# Patient Record
Sex: MALE | Race: WHITE | NOT HISPANIC OR LATINO | Employment: FULL TIME | URBAN - METROPOLITAN AREA
[De-identification: names, ages, dates, MRNs, and addresses within clinical notes are randomized per-mention and may not be internally consistent; named-entity substitution may affect disease eponyms.]

---

## 2023-08-31 DIAGNOSIS — I10 HYPERTENSION, UNSPECIFIED TYPE: ICD-10-CM

## 2023-08-31 DIAGNOSIS — B02.8 HERPES ZOSTER INFECTION OF ORAL MUCOSA: ICD-10-CM

## 2023-08-31 RX ORDER — VALACYCLOVIR HYDROCHLORIDE 1 G/1
TABLET, FILM COATED ORAL DAILY
COMMUNITY
Start: 2016-06-24 | End: 2023-08-31 | Stop reason: SDUPTHER

## 2023-08-31 RX ORDER — VALACYCLOVIR HYDROCHLORIDE 1 G/1
2000 TABLET, FILM COATED ORAL 2 TIMES DAILY
Qty: 12 TABLET | Refills: 0 | Status: SHIPPED | OUTPATIENT
Start: 2023-08-31 | End: 2023-09-01

## 2023-08-31 RX ORDER — PROPRANOLOL HYDROCHLORIDE 20 MG/1
20 TABLET ORAL DAILY
COMMUNITY
End: 2023-08-31 | Stop reason: SDUPTHER

## 2023-08-31 RX ORDER — PROPRANOLOL HYDROCHLORIDE 20 MG/1
20 TABLET ORAL DAILY
Qty: 30 TABLET | Refills: 1 | Status: SHIPPED | OUTPATIENT
Start: 2023-08-31 | End: 2024-01-17 | Stop reason: SDUPTHER

## 2023-09-06 DIAGNOSIS — Z00.00 HEALTHCARE MAINTENANCE: ICD-10-CM

## 2023-09-12 PROBLEM — M67.432 GANGLION CYST OF WRIST, LEFT: Status: ACTIVE | Noted: 2023-09-12

## 2023-09-12 PROBLEM — R14.0 ABDOMINAL BLOATING: Status: ACTIVE | Noted: 2023-09-12

## 2023-09-12 PROBLEM — K62.5 RECTAL BLEEDING: Status: ACTIVE | Noted: 2023-09-12

## 2023-09-12 PROBLEM — R10.9 ABDOMINAL PAIN: Status: ACTIVE | Noted: 2023-09-12

## 2023-09-12 PROBLEM — R63.5 WEIGHT INCREASE: Status: ACTIVE | Noted: 2023-09-12

## 2023-09-12 PROBLEM — N46.9 MALE INFERTILITY: Status: ACTIVE | Noted: 2023-09-12

## 2023-09-12 PROBLEM — R22.32 ARM MASS, LEFT: Status: ACTIVE | Noted: 2023-09-12

## 2023-09-12 PROBLEM — E78.00 ELEVATED LDL CHOLESTEROL LEVEL: Status: ACTIVE | Noted: 2023-09-12

## 2023-09-12 PROBLEM — L72.9 SUBCUTANEOUS CYST: Status: ACTIVE | Noted: 2023-09-12

## 2023-09-12 PROBLEM — F43.0 STRESS REACTION: Status: ACTIVE | Noted: 2023-09-12

## 2023-09-12 PROBLEM — K13.79 LUMP IN MOUTH: Status: ACTIVE | Noted: 2023-09-12

## 2023-09-12 PROBLEM — R10.31 COLICKY RIGHT LOWER QUADRANT PAIN: Status: ACTIVE | Noted: 2023-09-12

## 2023-09-12 PROBLEM — I10 BENIGN ESSENTIAL HYPERTENSION: Status: ACTIVE | Noted: 2023-09-12

## 2023-09-12 PROBLEM — K11.6: Status: ACTIVE | Noted: 2023-09-12

## 2023-09-12 PROBLEM — N52.9 MALE ERECTILE DISORDER: Status: ACTIVE | Noted: 2023-09-12

## 2023-09-12 PROBLEM — K63.5 COLON POLYPS: Status: ACTIVE | Noted: 2023-09-12

## 2023-09-12 PROBLEM — B00.1 RECURRENT COLD SORES: Status: ACTIVE | Noted: 2023-09-12

## 2023-09-12 RX ORDER — SILDENAFIL CITRATE 20 MG/1
20 TABLET ORAL 3 TIMES DAILY
COMMUNITY
End: 2023-09-22 | Stop reason: DRUGHIGH

## 2023-09-12 RX ORDER — VALACYCLOVIR HYDROCHLORIDE 1 G/1
1000 TABLET, FILM COATED ORAL 2 TIMES DAILY
COMMUNITY
End: 2024-03-13 | Stop reason: SDUPTHER

## 2023-09-22 ENCOUNTER — TELEPHONE (OUTPATIENT)
Dept: PRIMARY CARE | Facility: CLINIC | Age: 33
End: 2023-09-22

## 2023-09-22 RX ORDER — SILDENAFIL CITRATE 20 MG/1
20-60 TABLET ORAL DAILY PRN
Qty: 60 TABLET | Refills: 0 | Status: SHIPPED | OUTPATIENT
Start: 2023-09-22 | End: 2023-10-25 | Stop reason: SDUPTHER

## 2023-09-26 ENCOUNTER — APPOINTMENT (OUTPATIENT)
Dept: PRIMARY CARE | Facility: CLINIC | Age: 33
End: 2023-09-26

## 2023-10-20 ENCOUNTER — LAB (OUTPATIENT)
Dept: LAB | Facility: LAB | Age: 33
End: 2023-10-20
Payer: COMMERCIAL

## 2023-10-20 DIAGNOSIS — Z00.00 HEALTHCARE MAINTENANCE: ICD-10-CM

## 2023-10-20 LAB
25(OH)D3 SERPL-MCNC: 23 NG/ML (ref 30–100)
ALBUMIN SERPL BCP-MCNC: 4.5 G/DL (ref 3.4–5)
ALP SERPL-CCNC: 69 U/L (ref 33–120)
ALT SERPL W P-5'-P-CCNC: 18 U/L (ref 10–52)
ANION GAP SERPL CALC-SCNC: 12 MMOL/L (ref 10–20)
APPEARANCE UR: CLEAR
AST SERPL W P-5'-P-CCNC: 16 U/L (ref 9–39)
BASOPHILS # BLD AUTO: 0.05 X10*3/UL (ref 0–0.1)
BASOPHILS NFR BLD AUTO: 0.9 %
BILIRUB SERPL-MCNC: 0.8 MG/DL (ref 0–1.2)
BILIRUB UR STRIP.AUTO-MCNC: NEGATIVE MG/DL
BUN SERPL-MCNC: 15 MG/DL (ref 6–23)
CALCIUM SERPL-MCNC: 9.3 MG/DL (ref 8.6–10.3)
CHLORIDE SERPL-SCNC: 101 MMOL/L (ref 98–107)
CHOLEST SERPL-MCNC: 263 MG/DL (ref 0–199)
CHOLESTEROL/HDL RATIO: 8.6
CO2 SERPL-SCNC: 29 MMOL/L (ref 21–32)
COLOR UR: YELLOW
CREAT SERPL-MCNC: 1.05 MG/DL (ref 0.5–1.3)
CRP SERPL HS-MCNC: 2.9 MG/L
EOSINOPHIL # BLD AUTO: 0.24 X10*3/UL (ref 0–0.7)
EOSINOPHIL NFR BLD AUTO: 4.4 %
ERYTHROCYTE [DISTWIDTH] IN BLOOD BY AUTOMATED COUNT: 11.4 % (ref 11.5–14.5)
EST. AVERAGE GLUCOSE BLD GHB EST-MCNC: 105 MG/DL
GFR SERPL CREATININE-BSD FRML MDRD: >90 ML/MIN/1.73M*2
GLUCOSE SERPL-MCNC: 87 MG/DL (ref 74–99)
GLUCOSE UR STRIP.AUTO-MCNC: NEGATIVE MG/DL
HBA1C MFR BLD: 5.3 %
HCT VFR BLD AUTO: 43.8 % (ref 41–52)
HDLC SERPL-MCNC: 30.6 MG/DL
HGB BLD-MCNC: 14.9 G/DL (ref 13.5–17.5)
IMM GRANULOCYTES # BLD AUTO: 0.02 X10*3/UL (ref 0–0.7)
IMM GRANULOCYTES NFR BLD AUTO: 0.4 % (ref 0–0.9)
KETONES UR STRIP.AUTO-MCNC: NEGATIVE MG/DL
LDLC SERPL CALC-MCNC: ABNORMAL MG/DL
LEUKOCYTE ESTERASE UR QL STRIP.AUTO: NEGATIVE
LYMPHOCYTES # BLD AUTO: 1.61 X10*3/UL (ref 1.2–4.8)
LYMPHOCYTES NFR BLD AUTO: 29.3 %
MCH RBC QN AUTO: 30 PG (ref 26–34)
MCHC RBC AUTO-ENTMCNC: 34 G/DL (ref 32–36)
MCV RBC AUTO: 88 FL (ref 80–100)
MONOCYTES # BLD AUTO: 0.5 X10*3/UL (ref 0.1–1)
MONOCYTES NFR BLD AUTO: 9.1 %
NEUTROPHILS # BLD AUTO: 3.08 X10*3/UL (ref 1.2–7.7)
NEUTROPHILS NFR BLD AUTO: 55.9 %
NITRITE UR QL STRIP.AUTO: NEGATIVE
NON HDL CHOLESTEROL: 232 MG/DL (ref 0–149)
NRBC BLD-RTO: 0 /100 WBCS (ref 0–0)
PH UR STRIP.AUTO: 6 [PH]
PLATELET # BLD AUTO: 271 X10*3/UL (ref 150–450)
PMV BLD AUTO: 10 FL (ref 7.5–11.5)
POTASSIUM SERPL-SCNC: 4 MMOL/L (ref 3.5–5.3)
PROT SERPL-MCNC: 6.9 G/DL (ref 6.4–8.2)
PROT UR STRIP.AUTO-MCNC: NEGATIVE MG/DL
RBC # BLD AUTO: 4.96 X10*6/UL (ref 4.5–5.9)
RBC # UR STRIP.AUTO: NEGATIVE /UL
SODIUM SERPL-SCNC: 138 MMOL/L (ref 136–145)
SP GR UR STRIP.AUTO: 1.03
TRIGL SERPL-MCNC: 599 MG/DL (ref 0–149)
TSH SERPL-ACNC: 1.43 MIU/L (ref 0.44–3.98)
UROBILINOGEN UR STRIP.AUTO-MCNC: <2 MG/DL
VLDL: ABNORMAL
WBC # BLD AUTO: 5.5 X10*3/UL (ref 4.4–11.3)

## 2023-10-20 PROCEDURE — 80061 LIPID PANEL: CPT

## 2023-10-20 PROCEDURE — 81003 URINALYSIS AUTO W/O SCOPE: CPT

## 2023-10-20 PROCEDURE — 85025 COMPLETE CBC W/AUTO DIFF WBC: CPT

## 2023-10-20 PROCEDURE — 80053 COMPREHEN METABOLIC PANEL: CPT

## 2023-10-20 PROCEDURE — 84443 ASSAY THYROID STIM HORMONE: CPT

## 2023-10-20 PROCEDURE — 82306 VITAMIN D 25 HYDROXY: CPT

## 2023-10-20 PROCEDURE — 36415 COLL VENOUS BLD VENIPUNCTURE: CPT

## 2023-10-20 PROCEDURE — 86141 C-REACTIVE PROTEIN HS: CPT

## 2023-10-20 PROCEDURE — 83036 HEMOGLOBIN GLYCOSYLATED A1C: CPT

## 2023-10-23 ENCOUNTER — OFFICE VISIT (OUTPATIENT)
Dept: PRIMARY CARE | Facility: CLINIC | Age: 33
End: 2023-10-23

## 2023-10-23 VITALS
HEART RATE: 55 BPM | OXYGEN SATURATION: 95 % | HEIGHT: 74 IN | DIASTOLIC BLOOD PRESSURE: 80 MMHG | SYSTOLIC BLOOD PRESSURE: 132 MMHG | BODY MASS INDEX: 31.06 KG/M2 | WEIGHT: 242 LBS

## 2023-10-23 DIAGNOSIS — R52 PAIN IN SURGICAL SCAR: ICD-10-CM

## 2023-10-23 DIAGNOSIS — F41.9 ANXIETY: ICD-10-CM

## 2023-10-23 DIAGNOSIS — Z00.01 ENCOUNTER FOR GENERAL ADULT MEDICAL EXAMINATION WITH ABNORMAL FINDINGS: Primary | ICD-10-CM

## 2023-10-23 DIAGNOSIS — B00.1 COLD SORE: ICD-10-CM

## 2023-10-23 DIAGNOSIS — K21.9 GASTROESOPHAGEAL REFLUX DISEASE, UNSPECIFIED WHETHER ESOPHAGITIS PRESENT: ICD-10-CM

## 2023-10-23 DIAGNOSIS — R07.9 CHEST PAIN, UNSPECIFIED TYPE: ICD-10-CM

## 2023-10-23 DIAGNOSIS — L90.5 PAIN IN SURGICAL SCAR: ICD-10-CM

## 2023-10-23 DIAGNOSIS — E78.5 DYSLIPIDEMIA: ICD-10-CM

## 2023-10-23 DIAGNOSIS — K63.5 POLYP OF COLON, UNSPECIFIED PART OF COLON, UNSPECIFIED TYPE: ICD-10-CM

## 2023-10-23 PROCEDURE — 3075F SYST BP GE 130 - 139MM HG: CPT | Performed by: FAMILY MEDICINE

## 2023-10-23 PROCEDURE — 1036F TOBACCO NON-USER: CPT | Performed by: FAMILY MEDICINE

## 2023-10-23 PROCEDURE — 3079F DIAST BP 80-89 MM HG: CPT | Performed by: FAMILY MEDICINE

## 2023-10-23 PROCEDURE — 93000 ELECTROCARDIOGRAM COMPLETE: CPT | Performed by: FAMILY MEDICINE

## 2023-10-23 PROCEDURE — UHSPHYS PR UH SELECT PHYSICAL: Performed by: FAMILY MEDICINE

## 2023-10-23 RX ORDER — FAMOTIDINE 40 MG/1
40 TABLET, FILM COATED ORAL NIGHTLY
Qty: 30 TABLET | Refills: 1 | Status: SHIPPED | OUTPATIENT
Start: 2023-10-23

## 2023-10-23 ASSESSMENT — PAIN SCALES - GENERAL: PAINLEVEL: 0-NO PAIN

## 2023-10-23 NOTE — PROGRESS NOTES
"Subjective   Patient ID: Ronnie Ya is a 33 y.o. male who presents for Annual Exam ([facility] ).  HPI  1) For the past few months or so is experiencing chest pains, can be at any time  Can last hours to or up to a week  No prior history of this problem  Seemed to start after he went running 1 time and has really waxed and waned since then  He has not had any recent activity intolerance  Denies any significant shortness of breath, dizziness, lightheadedness, hand or foot swelling that is new or different  Question if there might be a heartburn or reflux issue  He does notice a lot of throat discomfort  Does not do a lot of late-night eating, but he does drink a significant mount of alcohol and often overeats as well  Question this may be a reflux issue although he has not tried any acid neutralizer's or acid reducers  There is no immediate family history of early cardiac disease but both a paternal grandfather and maternal grandfather apparently had coronary issues  He has had a history of some stress/anxiety (they have an infant at home who developed RSV and had to go back in the hospital, 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)  Has been to a psychologist and psychiatrist in the past  Questionable benefit for the former, really no benefit as to the latter  He has been on Cymbalta, Wellbutrin, Adderall without any clear benefit and seems to be managing things okay with propranolol on a short-term basis  He has not been exercising like he used to (in fact he is really not doing anything at all except for going on a run maybe once a week) - also reports that his nutrition \"could be better\", states that he stress eats and overeats and tends to have sugary drinks and trouble controlling his portions  States he did really well nutrition wise when he had been doing and recording everything  Has not really ever tried meditation but states he is versed in breath work and finds that to be helpful tool  2) history " "of colon polyps and advised he needed to have that done every 3 to 5 years  Appears last time was 5 years ago occasional bright red blood per rectum and was told he had internal hemorrhoids  No painful bleeding and no change in stools such as dark tarry stool or gross blood in stool  No immediate family history of colon disease   3) history of cold sores, well-managed with valacyclovir 1 day treatment   4) prior history of angiolipomas/cysts removed from upper extremities  The scars are very painful and sore to the touch  He would like to about them getting revised would recommend plastic surgeon for this    Review of Systems  Essentially noncontributory otherwise    Objective   /80 (BP Location: Right arm, Patient Position: Sitting, BP Cuff Size: Large adult)   Pulse 55   Ht 1.88 m (6' 2\")   Wt 110 kg (242 lb)   SpO2 95%   BMI 31.07 kg/m²     Physical Exam  Vitals and nursing note reviewed.   Constitutional:       General: He is not in acute distress.     Appearance: He is not ill-appearing.   HENT:      Head: Normocephalic and atraumatic.      Right Ear: Tympanic membrane normal.      Left Ear: Tympanic membrane normal.      Mouth/Throat:      Pharynx: No posterior oropharyngeal erythema.   Eyes:      General: No scleral icterus.     Extraocular Movements: Extraocular movements intact.      Pupils: Pupils are equal, round, and reactive to light.   Cardiovascular:      Rate and Rhythm: Normal rate and regular rhythm.      Heart sounds: No murmur heard.  Pulmonary:      Breath sounds: Normal breath sounds. No wheezing or rhonchi.   Abdominal:      General: Bowel sounds are normal. There is no distension.      Palpations: Abdomen is soft.      Tenderness: There is no abdominal tenderness.   Musculoskeletal:         General: Normal range of motion.      Cervical back: Normal range of motion.      Right lower leg: No edema.      Left lower leg: No edema.   Lymphadenopathy:      Cervical: No cervical " adenopathy.   Skin:     General: Skin is warm and dry.   Neurological:      Mental Status: He is alert and oriented to person, place, and time. Mental status is at baseline.      Motor: No weakness.      Coordination: Coordination normal.      Deep Tendon Reflexes: Reflexes normal.   Psychiatric:         Mood and Affect: Mood normal.         Behavior: Behavior normal.         Thought Content: Thought content normal.         Judgment: Judgment normal.         Assessment/Plan   Problem List Items Addressed This Visit       Colon polyps    Relevant Orders    Colonoscopy Screening; Average Risk Patient     Other Visit Diagnoses       Encounter for general adult medical examination with abnormal findings    -  Primary    Dyslipidemia        Chest pain, unspecified type        Relevant Orders    ECG 12 Lead    EGD    Gastroesophageal reflux disease, unspecified whether esophagitis present        Relevant Medications    famotidine (Pepcid) 40 mg tablet    Other Relevant Orders    EGD    Anxiety        Pain in surgical scar        Relevant Orders    Referral to Plastic Surgery    Cold sore            Orders as above  1) for reflux and chest pain issues feel this is perhaps a combination of stress, alcohol consumption, reflux  We will do trial of famotidine 40 mg at bedtime, if really no improvement over next week would consider taking twice daily  Patient update us  2) dyslipidemia, very high triglycerides and low HDL  Strongly encouraged with dietary modification will help improve his triglycerides but really the only way to go forward and improve his HDL is through more regular activity  Intensive lifestyle changes he is hesitant to set an exercise goal but plans over the next 3 to 4 weeks to engage in better portion control decrease his indulgences to really 2 times per week and reduce alcohol to only on the weekends lipid order placed to follow-up  3) he also mentioned that he has had 2 days in November the past years  of significant sweats  Feels he may have picked up something when he was traveling abroad and had not taken prophylactic malaria pills given to him -was not diagnosed with malaria  Recent CBC did not reveal any marked abnormality but discussed rechecking his CBC when he has had these sweating episodes again  Consider a malaria smear in the throes of these episodes

## 2023-10-23 NOTE — Clinical Note
Patient needs C-scope and EGD - MAY need assistance in scheduling - thanks! 
Ricky Neumann on behalf of Dr. Moreira

## 2023-10-25 DIAGNOSIS — N52.9 MALE ERECTILE DISORDER: ICD-10-CM

## 2023-10-25 RX ORDER — SILDENAFIL CITRATE 20 MG/1
20-60 TABLET ORAL DAILY PRN
Qty: 60 TABLET | Refills: 3 | Status: SHIPPED | OUTPATIENT
Start: 2023-10-25 | End: 2024-05-30 | Stop reason: SDUPTHER

## 2023-10-26 DIAGNOSIS — Z12.11 SPECIAL SCREENING FOR MALIGNANT NEOPLASMS, COLON: ICD-10-CM

## 2023-10-26 RX ORDER — POLYETHYLENE GLYCOL 3350, SODIUM SULFATE ANHYDROUS, SODIUM BICARBONATE, SODIUM CHLORIDE, POTASSIUM CHLORIDE 236; 22.74; 6.74; 5.86; 2.97 G/4L; G/4L; G/4L; G/4L; G/4L
POWDER, FOR SOLUTION ORAL
Qty: 4000 ML | Refills: 0 | Status: SHIPPED | OUTPATIENT
Start: 2023-10-26 | End: 2024-05-30 | Stop reason: HOSPADM

## 2023-11-03 ENCOUNTER — TELEPHONE (OUTPATIENT)
Dept: PRIMARY CARE | Facility: CLINIC | Age: 33
End: 2023-11-03

## 2023-11-03 DIAGNOSIS — F41.9 ANXIETY: Primary | ICD-10-CM

## 2023-11-03 RX ORDER — FLUOXETINE 10 MG/1
10 TABLET ORAL DAILY
Qty: 30 TABLET | Refills: 3 | Status: SHIPPED | OUTPATIENT
Start: 2023-11-03 | End: 2024-03-13 | Stop reason: SDUPTHER

## 2023-11-04 NOTE — TELEPHONE ENCOUNTER
Called and discussed with patient  States the propranolol really is not doing him much.  Does not seem to last very long and provide significant relief he did note that he had been on bupropion in the past and duloxetine, cannot remember which 1 of these was problematic when he was taking Adderall  Not aware of his immediate family taking anything for anxiety such as this  Discussed options and ultimately seems to prefer to try fluoxetine at low-dose as ordered    Requested Prescriptions     Signed Prescriptions Disp Refills    FLUoxetine (PROzac) 10 mg tablet 30 tablet 3     Sig: Take 1 tablet (10 mg) by mouth once daily.     Authorizing Provider: PREET LY to update me within a week or so

## 2023-11-21 ENCOUNTER — TELEPHONE (OUTPATIENT)
Dept: PRIMARY CARE | Facility: CLINIC | Age: 33
End: 2023-11-21

## 2023-11-21 DIAGNOSIS — E78.1 HIGH TRIGLYCERIDES: ICD-10-CM

## 2023-11-21 DIAGNOSIS — R63.5 WEIGHT INCREASE: Primary | ICD-10-CM

## 2023-11-22 ENCOUNTER — LAB (OUTPATIENT)
Dept: LAB | Facility: LAB | Age: 33
End: 2023-11-22
Payer: COMMERCIAL

## 2023-11-22 DIAGNOSIS — R63.5 WEIGHT INCREASE: ICD-10-CM

## 2023-11-22 DIAGNOSIS — E78.1 HIGH TRIGLYCERIDES: ICD-10-CM

## 2023-11-22 LAB
CHOLEST SERPL-MCNC: 246 MG/DL (ref 0–199)
CHOLESTEROL/HDL RATIO: 6.4
HDLC SERPL-MCNC: 38.7 MG/DL
LDLC SERPL CALC-MCNC: ABNORMAL MG/DL
NON HDL CHOLESTEROL: 207 MG/DL (ref 0–149)
TRIGL SERPL-MCNC: 525 MG/DL (ref 0–149)
VLDL: ABNORMAL

## 2023-11-22 PROCEDURE — 80061 LIPID PANEL: CPT

## 2023-11-22 PROCEDURE — 36415 COLL VENOUS BLD VENIPUNCTURE: CPT

## 2023-11-22 NOTE — TELEPHONE ENCOUNTER
Called to discuss with patient   Med has made him feel less anxious - doing well - not sure that he has reached full level  We will continue at current dose    Also plans to go in for lipid panel to see if his triglycerides have decreased since his visit last, he was interested in trying something for weight management weight loss and was asking about Wegovy or the like  Told him even if he did prescribe it could be difficult to come by  Also, have some hesitancy in someone as young as he is starting this medicine really do not know the full long-term risks of use of such medication  Did mention him the possibility of trying naltrexone to manage his weight as it can curb EtOH and perhaps appetite as well - he will think about it

## 2023-11-29 ENCOUNTER — TELEPHONE (OUTPATIENT)
Dept: PRIMARY CARE | Facility: CLINIC | Age: 33
End: 2023-11-29

## 2023-12-11 ENCOUNTER — OFFICE VISIT (OUTPATIENT)
Dept: GASTROENTEROLOGY | Facility: EXTERNAL LOCATION | Age: 33
End: 2023-12-11
Payer: COMMERCIAL

## 2023-12-11 DIAGNOSIS — R07.9 CHEST PAIN, UNSPECIFIED TYPE: ICD-10-CM

## 2023-12-11 DIAGNOSIS — R13.19 ESOPHAGEAL DYSPHAGIA: ICD-10-CM

## 2023-12-11 DIAGNOSIS — K21.9 GASTROESOPHAGEAL REFLUX DISEASE, UNSPECIFIED WHETHER ESOPHAGITIS PRESENT: ICD-10-CM

## 2023-12-11 DIAGNOSIS — R12 HEARTBURN: ICD-10-CM

## 2023-12-11 DIAGNOSIS — K63.5 POLYP OF COLON, UNSPECIFIED PART OF COLON, UNSPECIFIED TYPE: ICD-10-CM

## 2023-12-11 DIAGNOSIS — K29.70 GASTRITIS WITHOUT BLEEDING, UNSPECIFIED CHRONICITY, UNSPECIFIED GASTRITIS TYPE: Primary | ICD-10-CM

## 2023-12-11 DIAGNOSIS — Z86.010 PERSONAL HISTORY OF COLONIC POLYPS: ICD-10-CM

## 2023-12-11 DIAGNOSIS — D12.5 BENIGN NEOPLASM OF SIGMOID COLON: ICD-10-CM

## 2023-12-11 DIAGNOSIS — Z12.11 SCREEN FOR COLON CANCER: ICD-10-CM

## 2023-12-11 DIAGNOSIS — D12.3 BENIGN NEOPLASM OF TRANSVERSE COLON: ICD-10-CM

## 2023-12-11 PROCEDURE — 1036F TOBACCO NON-USER: CPT | Performed by: INTERNAL MEDICINE

## 2023-12-11 PROCEDURE — 45385 COLONOSCOPY W/LESION REMOVAL: CPT | Performed by: INTERNAL MEDICINE

## 2023-12-11 PROCEDURE — 88305 TISSUE EXAM BY PATHOLOGIST: CPT

## 2023-12-11 PROCEDURE — 88305 TISSUE EXAM BY PATHOLOGIST: CPT | Performed by: PATHOLOGY

## 2023-12-11 PROCEDURE — 43239 EGD BIOPSY SINGLE/MULTIPLE: CPT | Performed by: INTERNAL MEDICINE

## 2023-12-14 ENCOUNTER — LAB REQUISITION (OUTPATIENT)
Dept: LAB | Facility: HOSPITAL | Age: 33
End: 2023-12-14
Payer: COMMERCIAL

## 2023-12-15 ENCOUNTER — TELEPHONE (OUTPATIENT)
Dept: PRIMARY CARE | Facility: CLINIC | Age: 33
End: 2023-12-15
Payer: COMMERCIAL

## 2023-12-15 DIAGNOSIS — N52.9 MALE ERECTILE DISORDER: ICD-10-CM

## 2023-12-15 DIAGNOSIS — E66.9 CLASS 1 OBESITY WITHOUT SERIOUS COMORBIDITY WITH BODY MASS INDEX (BMI) OF 31.0 TO 31.9 IN ADULT, UNSPECIFIED OBESITY TYPE: Primary | ICD-10-CM

## 2023-12-15 DIAGNOSIS — E78.00 ELEVATED LDL CHOLESTEROL LEVEL: ICD-10-CM

## 2023-12-15 NOTE — PROGRESS NOTES
Had called previously discussed with patient  He is interested in trying low-dose naltrexone as noted previously  Sent prescription and is noted  Also placed standing lab order for lipid profile as he will use that as a marker for his overall improvement with his nutrition and activity

## 2023-12-18 NOTE — TELEPHONE ENCOUNTER
Pt advised that RX was sent for 60 tab back in October with 3 refills.  Refills should still be on file with the Pharmacy.  Patient understood and agreeable and will reach out to them directly.

## 2023-12-19 ENCOUNTER — TELEPHONE (OUTPATIENT)
Dept: PRIMARY CARE | Facility: CLINIC | Age: 33
End: 2023-12-19
Payer: COMMERCIAL

## 2023-12-19 DIAGNOSIS — E66.9 CLASS 1 OBESITY WITHOUT SERIOUS COMORBIDITY WITH BODY MASS INDEX (BMI) OF 31.0 TO 31.9 IN ADULT, UNSPECIFIED OBESITY TYPE: ICD-10-CM

## 2023-12-19 NOTE — TELEPHONE ENCOUNTER
I advised we could send to [facility].  I advised that insurance likely will not cover however he could pay with his HSA.  Patient understood and agreeable.  Please resend to [facility] if appropriate.

## 2023-12-20 NOTE — TELEPHONE ENCOUNTER
Agree - will send there - they should reach out to him about this    Requested Prescriptions     Signed Prescriptions Disp Refills    naltrexone 1.5 mg capsule 30 capsule 1     Sig: Take 1.5 mg by mouth once daily at bedtime.     Authorizing Provider: PREET LY

## 2023-12-21 NOTE — TELEPHONE ENCOUNTER
Had called to review poor lipid results - still concerning    At this at this point he would really like to work on some lifestyle changes, primarily targeting weight loss  He had done some research and believes something like naltrexone (low-dose) may be beneficial for him   told him I would look into it it back to about it  I think it would also help him curb some of his alcohol intake

## 2023-12-26 LAB
LABORATORY COMMENT REPORT: NORMAL
PATH REPORT.FINAL DX SPEC: NORMAL
PATH REPORT.GROSS SPEC: NORMAL
PATH REPORT.RELEVANT HX SPEC: NORMAL
PATH REPORT.TOTAL CANCER: NORMAL

## 2024-01-17 DIAGNOSIS — I10 HYPERTENSION, UNSPECIFIED TYPE: ICD-10-CM

## 2024-01-17 RX ORDER — PROPRANOLOL HYDROCHLORIDE 20 MG/1
20 TABLET ORAL DAILY
Qty: 30 TABLET | Refills: 11 | Status: SHIPPED | OUTPATIENT
Start: 2024-01-17 | End: 2024-03-13 | Stop reason: SDUPTHER

## 2024-01-25 ENCOUNTER — TELEPHONE (OUTPATIENT)
Dept: PRIMARY CARE | Facility: CLINIC | Age: 34
End: 2024-01-25
Payer: COMMERCIAL

## 2024-01-25 DIAGNOSIS — J30.0 VASOMOTOR RHINITIS: Primary | ICD-10-CM

## 2024-01-25 RX ORDER — IPRATROPIUM BROMIDE 42 UG/1
1-2 SPRAY, METERED NASAL 2 TIMES DAILY PRN
Qty: 15 ML | Refills: 11 | Status: SHIPPED | OUTPATIENT
Start: 2024-01-25

## 2024-01-27 NOTE — TELEPHONE ENCOUNTER
1) patient did not pursue naltrexone  He is not interested in it for now would like to see how his best efforts with activity nutrition can lead to optimal cholesterol profile  He does have a lipid order in the system to check when he would like to do so    2) Long history of throat congestion and snoring  He did try a nasal steroid in the past which did not seem to do anything for him  We tried acid reducers which did not help either  No significant breathing problems or cough otherwise  On further questioning may have a form of vasomotor rhinitis recommend a trial of the following  If this does not work consider eval by ENT  Requested Prescriptions     Signed Prescriptions Disp Refills    ipratropium (Atrovent) 42 mcg (0.06 %) nasal spray 15 mL 11     Sig: Administer 1-2 sprays into each nostril 2 times a day as needed for rhinitis.     Authorizing Provider: PREET LY

## 2024-02-07 ENCOUNTER — TELEPHONE (OUTPATIENT)
Dept: PRIMARY CARE | Facility: CLINIC | Age: 34
End: 2024-02-07
Payer: COMMERCIAL

## 2024-02-07 DIAGNOSIS — R09.81 NASAL CONGESTION: Primary | ICD-10-CM

## 2024-02-07 DIAGNOSIS — R06.83 SNORING: ICD-10-CM

## 2024-02-07 NOTE — TELEPHONE ENCOUNTER
Email received from the patient at 2:30am.     Jasmyn Manzo,    Does Dr Duncan have any time to catch up tomorrow following on from our last call?    ThanksRonnie

## 2024-02-09 NOTE — TELEPHONE ENCOUNTER
Called back and discussed with patient  He has been using the ipratropium without significant benefit  Agreeable to evaluation by ENT    Recommend Dr. Sandoval as next step

## 2024-02-13 NOTE — TELEPHONE ENCOUNTER
Laila attempted to reach patient x2 to arrange appointment.   Patient to call her back to schedule at this convenience.

## 2024-03-13 DIAGNOSIS — F41.9 ANXIETY: ICD-10-CM

## 2024-03-13 DIAGNOSIS — B00.1 RECURRENT COLD SORES: ICD-10-CM

## 2024-03-13 DIAGNOSIS — I10 HYPERTENSION, UNSPECIFIED TYPE: ICD-10-CM

## 2024-03-14 ENCOUNTER — TELEPHONE (OUTPATIENT)
Dept: PRIMARY CARE | Facility: CLINIC | Age: 34
End: 2024-03-14
Payer: COMMERCIAL

## 2024-03-14 DIAGNOSIS — R68.82 LIBIDO, DECREASED: Primary | ICD-10-CM

## 2024-03-14 RX ORDER — VALACYCLOVIR HYDROCHLORIDE 1 G/1
2000 TABLET, FILM COATED ORAL DAILY
Qty: 8 TABLET | Refills: 2 | Status: SHIPPED | OUTPATIENT
Start: 2024-03-14 | End: 2024-03-15

## 2024-03-14 RX ORDER — PROPRANOLOL HYDROCHLORIDE 20 MG/1
20 TABLET ORAL DAILY
Qty: 90 TABLET | Refills: 3 | Status: SHIPPED | OUTPATIENT
Start: 2024-03-14

## 2024-03-14 RX ORDER — FLUOXETINE 10 MG/1
10 TABLET ORAL DAILY
Qty: 90 TABLET | Refills: 3 | Status: SHIPPED | OUTPATIENT
Start: 2024-03-14

## 2024-03-14 NOTE — TELEPHONE ENCOUNTER
Pt called last week - he would like a return call at your convenience.  There are a couple of things that he would like to run by you.

## 2024-03-15 NOTE — TELEPHONE ENCOUNTER
Called back and discussed - has not been able to get labs processed  Has had a drop in his libido and would like to get his Testosterone checked

## 2024-03-21 ENCOUNTER — TELEPHONE (OUTPATIENT)
Dept: PRIMARY CARE | Facility: CLINIC | Age: 34
End: 2024-03-21
Payer: COMMERCIAL

## 2024-03-21 DIAGNOSIS — Z71.84 TRAVEL ADVICE ENCOUNTER: ICD-10-CM

## 2024-03-21 RX ORDER — AZITHROMYCIN 500 MG/1
500 TABLET, FILM COATED ORAL DAILY
Qty: 3 TABLET | Refills: 0 | Status: SHIPPED | OUTPATIENT
Start: 2024-03-21 | End: 2024-05-30 | Stop reason: WASHOUT

## 2024-03-21 RX ORDER — PREDNISONE 10 MG/1
TABLET ORAL
Qty: 25 TABLET | Refills: 0 | Status: SHIPPED | OUTPATIENT
Start: 2024-03-21 | End: 2024-05-30 | Stop reason: WASHOUT

## 2024-03-21 RX ORDER — DOXYCYCLINE 100 MG/1
100 TABLET ORAL 2 TIMES DAILY
Qty: 20 TABLET | Refills: 0 | Status: SHIPPED | OUTPATIENT
Start: 2024-03-21 | End: 2024-05-30 | Stop reason: WASHOUT

## 2024-03-21 RX ORDER — ONDANSETRON 4 MG/1
4-8 TABLET, FILM COATED ORAL EVERY 8 HOURS PRN
Qty: 20 TABLET | Refills: 0 | Status: SHIPPED | OUTPATIENT
Start: 2024-03-21

## 2024-03-21 RX ORDER — TRIAMCINOLONE ACETONIDE 1 MG/G
CREAM TOPICAL 2 TIMES DAILY
Qty: 30 G | Refills: 0 | Status: SHIPPED | OUTPATIENT
Start: 2024-03-21

## 2024-03-21 NOTE — TELEPHONE ENCOUNTER
Email received from the patient     Hi Anais,    I'm traveling internationally on Sunday and quite a bit the rest of the year. I was wondering if it's possible to get any precautionary medicines for my trips as I'm going to places where the food and water quality isn't great and the later trips I'm taking will be to tropical/[place]  environments.    I used to buy from [employer] but they're pretty expensive. When I used to travel to [place] i got super sick there since started carrying the following:    Azithromycin  Cephalexin  Doxycycline   Ondansetron   Ciproflaxin  Prednisone  Triamcinolone    Sorry if this is a usual request, thought I'd ask either way!    Thanks,    Ronnie

## 2024-03-21 NOTE — TELEPHONE ENCOUNTER
Saw below   Called patient no answer left message  Would recommend the meds only ordered as I do not see the need for cephalexin if he is taking doxycycline with him and also would be concerned for him or anyone to use Cipro for anything I imagine he would face went over there that the other meds can't handle  Enc to reach out to [place] from [place] as need be      Requested Prescriptions     Signed Prescriptions Disp Refills    azithromycin (Zithromax) 500 mg tablet 3 tablet 0     Sig: Take 1 tablet (500 mg) by mouth once daily. For up to 3 days for Travelers Diarrhea     Authorizing Provider: PREET LY    ondansetron (Zofran) 4 mg tablet 20 tablet 0     Sig: Take 1-2 tablets (4-8 mg) by mouth every 8 hours if needed for nausea or vomiting.     Authorizing Provider: PREET LY    doxycycline (Adoxa) 100 mg tablet 20 tablet 0     Sig: Take 1 tablet (100 mg) by mouth 2 times a day. For up to 10 days as directed for Skin or Respiratory Infection     Authorizing Provider: PREET LY    predniSONE (Deltasone) 10 mg tablet 25 tablet 0     Sig: Take 1-5 tablets per day for rash or joint swelling as indicated for up to 5 days     Authorizing Provider: PREET LY    triamcinolone (Kenalog) 0.1 % cream 30 g 0     Sig: Apply topically 2 times a day. Apply to affected area 1-2 times daily as needed. Avoid face and groin.     Authorizing Provider: PREET LY

## 2024-04-09 ENCOUNTER — TELEPHONE (OUTPATIENT)
Dept: PRIMARY CARE | Facility: CLINIC | Age: 34
End: 2024-04-09
Payer: COMMERCIAL

## 2024-04-09 NOTE — TELEPHONE ENCOUNTER
Pt states that he has been on Adderall in the past for ADD.   He thinks it was 20mg dose.  He would like to discuss restarting.

## 2024-04-19 ENCOUNTER — APPOINTMENT (OUTPATIENT)
Dept: PRIMARY CARE | Facility: CLINIC | Age: 34
End: 2024-04-19
Payer: COMMERCIAL

## 2024-04-25 ENCOUNTER — APPOINTMENT (OUTPATIENT)
Dept: PRIMARY CARE | Facility: CLINIC | Age: 34
End: 2024-04-25
Payer: COMMERCIAL

## 2024-05-29 ENCOUNTER — TELEPHONE (OUTPATIENT)
Dept: PRIMARY CARE | Facility: CLINIC | Age: 34
End: 2024-05-29
Payer: COMMERCIAL

## 2024-05-29 DIAGNOSIS — B00.9 HSV (HERPES SIMPLEX VIRUS) INFECTION: Primary | ICD-10-CM

## 2024-05-29 DIAGNOSIS — N52.9 MALE ERECTILE DISORDER: ICD-10-CM

## 2024-05-29 NOTE — TELEPHONE ENCOUNTER
Neptali Manzo hope you're well! Could I get a refill of my propranolol, Sildenifel and Valocyclovir please?    Best,    Ronnie      Patient advised to contact [facility] directly for refill of propranolol as it was filled back in March for 1 year.      Please send in RX for the other two requests.

## 2024-05-30 RX ORDER — SILDENAFIL CITRATE 20 MG/1
20-60 TABLET ORAL DAILY PRN
Qty: 60 TABLET | Refills: 1 | Status: SHIPPED | OUTPATIENT
Start: 2024-05-30

## 2024-05-30 RX ORDER — VALACYCLOVIR HYDROCHLORIDE 1 G/1
2000 TABLET, FILM COATED ORAL 2 TIMES DAILY
Qty: 4 TABLET | Refills: 1 | Status: SHIPPED | OUTPATIENT
Start: 2024-05-30 | End: 2024-05-31

## 2024-05-31 NOTE — TELEPHONE ENCOUNTER
Requested Prescriptions     Signed Prescriptions Disp Refills    sildenafil (Revatio) 20 mg tablet 60 tablet 1     Sig: Take 1-3 tablets (20-60 mg) by mouth once daily as needed (Erectile dysfunction).     Authorizing Provider: PREET LY    valACYclovir (Valtrex) 1 gram tablet 4 tablet 1     Sig: Take 2 tablets (2,000 mg) by mouth 2 times a day for 1 day. For outbreak. Repeat dosing in 5 days if necessary     Authorizing Provider: PREET LY

## 2024-06-14 ENCOUNTER — TELEPHONE (OUTPATIENT)
Dept: PRIMARY CARE | Facility: CLINIC | Age: 34
End: 2024-06-14
Payer: COMMERCIAL

## 2024-07-05 ENCOUNTER — TELEPHONE (OUTPATIENT)
Dept: PRIMARY CARE | Facility: CLINIC | Age: 34
End: 2024-07-05
Payer: COMMERCIAL

## 2024-07-05 DIAGNOSIS — B00.9 HSV (HERPES SIMPLEX VIRUS) INFECTION: Primary | ICD-10-CM

## 2024-07-05 RX ORDER — VALACYCLOVIR HYDROCHLORIDE 1 G/1
1000 TABLET, FILM COATED ORAL 2 TIMES DAILY
Qty: 12 TABLET | Refills: 0 | Status: SHIPPED | OUTPATIENT
Start: 2024-07-05 | End: 2024-07-08

## 2024-07-05 NOTE — TELEPHONE ENCOUNTER
Patient requesting RX for Valacyclovir be sent to the [facility] in [place].  He had this in his med list before, however not showing up now for some reason.

## 2024-07-25 NOTE — TELEPHONE ENCOUNTER
Requested Prescriptions     Signed Prescriptions Disp Refills    valACYclovir (Valtrex) 1 gram tablet 8 tablet 0     Sig: Take 2 tablets (2,000 mg) by mouth 2 times a day for 1 day. Repeat dosing in 3 days if indicated     Authorizing Provider: PREET LY     Had to call in

## 2024-08-16 ENCOUNTER — TELEPHONE (OUTPATIENT)
Dept: PRIMARY CARE | Facility: CLINIC | Age: 34
End: 2024-08-16
Payer: COMMERCIAL

## 2024-08-16 DIAGNOSIS — E66.9 CLASS 1 OBESITY WITHOUT SERIOUS COMORBIDITY WITH BODY MASS INDEX (BMI) OF 31.0 TO 31.9 IN ADULT, UNSPECIFIED OBESITY TYPE: Primary | ICD-10-CM

## 2024-08-16 NOTE — TELEPHONE ENCOUNTER
Patient states that he spoke with you previously about use of GLP-1.  He has done some research and would like to use a compounding pharmacy in [place] that one of his friends is using.  He would like to know if you would be willing to send the RX to this pharmacy if he provides the information.   Would like to discuss Monday.

## 2024-08-20 NOTE — TELEPHONE ENCOUNTER
Neptali Manzo,    Here is the pharmacy I mentioned on our call the other day: [email or web address]    Dr Duncan told me to look for a compound pharmacy for tirzeptide and this one came up the most cost effective but does require him to call it in. Is this something he could do?    Austin Trujillo

## 2024-08-20 NOTE — TELEPHONE ENCOUNTER
Detailed information sent back to the patient advising that we would be happy to take care of this -requested additional information for the pharmacy and what would be required.

## 2024-08-21 NOTE — TELEPHONE ENCOUNTER
Sent Via [employer]    Neptali Ruelas I was able to call in the Rx to the [facility]. They do not have any info on file for you. I did ask them to bill and ship directly to you.  Hope it works well for you.  TP